# Patient Record
Sex: FEMALE | Race: WHITE | NOT HISPANIC OR LATINO | Employment: OTHER | URBAN - METROPOLITAN AREA
[De-identification: names, ages, dates, MRNs, and addresses within clinical notes are randomized per-mention and may not be internally consistent; named-entity substitution may affect disease eponyms.]

---

## 2022-05-17 ENCOUNTER — HOSPITAL ENCOUNTER (EMERGENCY)
Facility: HOSPITAL | Age: 47
Discharge: HOME | End: 2022-05-17
Attending: EMERGENCY MEDICINE

## 2022-05-17 VITALS
HEIGHT: 66 IN | RESPIRATION RATE: 18 BRPM | HEART RATE: 96 BPM | OXYGEN SATURATION: 97 % | WEIGHT: 180 LBS | TEMPERATURE: 98 F | SYSTOLIC BLOOD PRESSURE: 159 MMHG | DIASTOLIC BLOOD PRESSURE: 91 MMHG | BODY MASS INDEX: 28.93 KG/M2

## 2022-05-17 DIAGNOSIS — L98.9 SKIN ABNORMALITY: Primary | ICD-10-CM

## 2022-05-17 PROCEDURE — 99283 EMERGENCY DEPT VISIT LOW MDM: CPT

## 2022-05-17 PROCEDURE — 25000003 PHARM REV CODE 250: Performed by: NURSE PRACTITIONER

## 2022-05-17 RX ORDER — ELECTROLYTES/DEXTROSE
1 SOLUTION, ORAL ORAL
Status: COMPLETED | OUTPATIENT
Start: 2022-05-17 | End: 2022-05-17

## 2022-05-17 RX ORDER — DOXYCYCLINE HYCLATE 100 MG
100 TABLET ORAL
Status: COMPLETED | OUTPATIENT
Start: 2022-05-17 | End: 2022-05-17

## 2022-05-17 RX ORDER — DOXYCYCLINE 100 MG/1
100 CAPSULE ORAL 2 TIMES DAILY
Qty: 20 CAPSULE | Refills: 0 | Status: SHIPPED | OUTPATIENT
Start: 2022-05-17

## 2022-05-17 RX ADMIN — HYDROCORTISONE 1 TUBE: 0.01 CREAM TOPICAL at 02:05

## 2022-05-17 RX ADMIN — DOXYCYCLINE HYCLATE 100 MG: 100 TABLET, COATED ORAL at 02:05

## 2022-05-17 NOTE — DISCHARGE INSTRUCTIONS
Take antibiotics as ordered. Hydrocortisone cream over the counter, do not use for >7d, cover area with sunscreen or otherwise protect from sun.  Return to the Emergency Department for any worsening, change in condition, or any emergent concerns.

## 2022-05-17 NOTE — ED PROVIDER NOTES
"Encounter Date: 5/17/2022    SCRIBE #1 NOTE: I, Vilma Gill, am scribing for, and in the presence of, Praveen Dawn DNP.       History     Chief Complaint   Patient presents with    Abscess     Pt reporting bump to left side of face. Pt reports it is "stinging". Mild redness noted.      Time seen by the provider: 2:40 PM  47 year old trans-woman presents to the ED to evaluate the small bump on the lower left side of her face that [place] last [place]. Patient states that she "heard a pop sound" and felt it stinging. She reports some mild redness and that the bump is slowly growing in size. Patient is unsure if it is due to an insect bite or sting. Patient has an upcoming plastic surgery and consulted with both her surgeon and PCP that told her to visit the ED for an evaluation. No treatment was attempted PTA. She denies fever, chills, sore throat, ear pain, or other associated symptoms.     The history is provided by the patient. No  was used.     Review of patient's allergies indicates:  No Known Allergies  History reviewed. No pertinent past medical history.  History reviewed. No pertinent surgical history.  History reviewed. No pertinent family history.  Social History     Tobacco Use    Smoking status: Former Smoker    Smokeless tobacco: Never Used   Substance Use Topics    Drug use: Never     Review of Systems   Constitutional: Negative for chills, fatigue and fever.   HENT: Negative for congestion, ear discharge, ear pain, postnasal drip, rhinorrhea, sinus pressure, sneezing, sore throat and voice change.    Eyes: Negative for discharge and itching.   Respiratory: Negative for cough, shortness of breath and wheezing.    Cardiovascular: Negative for chest pain, palpitations and leg swelling.   Gastrointestinal: Negative for abdominal pain, constipation, diarrhea, nausea and vomiting.   Endocrine: Negative for polydipsia, polyphagia and " polyuria.   Genitourinary: Negative for dysuria, frequency, hematuria, urgency, vaginal bleeding, vaginal discharge and vaginal pain.   Musculoskeletal: Negative for arthralgias and myalgias.   Skin: Negative for rash and wound.        (+) raised skin bump on face  (+) mild redness   Neurological: Negative for dizziness, seizures, syncope, weakness and numbness.   Hematological: Negative for adenopathy. Does not bruise/bleed easily.   Psychiatric/Behavioral: Negative for self-injury and suicidal ideas. The patient is not nervous/anxious.        Physical Exam     Initial Vitals [05/17/22 1427]   BP Pulse Resp Temp SpO2   (!) 170/101 96 18 98.4 °F (36.9 °C) 97 %      MAP       --         Physical Exam    Nursing note and vitals reviewed.  Constitutional: She appears well-developed and well-nourished.   HENT:   Head: Normocephalic and atraumatic.   Right Ear: External ear normal.   Left Ear: External ear normal.   Nose: Nose normal.   Eyes: Conjunctivae and EOM are normal. Pupils are equal, round, and reactive to light. Right eye exhibits no discharge. Left eye exhibits no discharge.   Neck:   Normal range of motion.  Cardiovascular: Normal rate, regular rhythm and normal heart sounds.   Pulmonary/Chest: Breath sounds normal.   Abdominal: She exhibits no distension.   Musculoskeletal:         General: Normal range of motion.      Cervical back: Normal range of motion.     Neurological: She is alert and oriented to person, place, and time.   Skin: Skin is dry. Capillary refill takes less than 2 seconds.   Small facial abnormality. Soft, non-tender. Small and discrete, approximately 1 cm. Subcutaneous.          ED Course   Procedures  Labs Reviewed - No data to display       Imaging Results    None          Medications   hydrocortisone-aloe vera 1 % cream 1 Tube (1 Tube Topical (Top) Given 5/17/22 1459)   doxycycline tablet 100 mg (100 mg Oral Given 5/17/22 1459)     Medical Decision Making:   History:   Old Medical  Records: I decided to obtain old medical records.  Initial Assessment:   47 year old trans-woman presents to the ED to evaluate the small bump on the lower left side of her face that appeared while [place] the [place] last week. No tests will be ordered but patient will be prescribed with antibiotics and a topical hydrocortisone cream. If abnormality persists, patient was advised to contact oral maxillofacial surgery for imaging.   Differential Diagnosis:   Differential Diagnosis includes, but is not limited to:  Infection from hair follicle or an early abscess that has not fully formed, insect bite/sting, cellulitis.          Scribe Attestation:   Scribe #1: I performed the above scribed service and the documentation accurately describes the services I performed. I attest to the accuracy of the note.        ED Course as of 05/17/22 2131 Tue May 17, 2022   1430 BP(!): 159/91 [VC]   1430 Temp: 98.4 °F (36.9 °C) [VC]   1430 Temp src: Oral [VC]   1430 Pulse: 96 [VC]   1430 Resp: 18 [VC]   1430 SpO2: 97 % [VC]      ED Course User Index  [VC] Praveen Dawn DNP             Clinical Impression:   Final diagnoses:  [L98.9] Skin abnormality (Primary)          ED Disposition Condition    Discharge Stable        ED Prescriptions     Medication Sig Dispense Start Date End Date Auth. Provider    doxycycline (MONODOX) 100 MG capsule Take 1 capsule (100 mg total) by mouth 2 (two) times daily. 20 capsule 5/17/2022  Praveen Dawn DNP        Follow-up Information     Follow up With Specialties Details Why Contact Info    Rafael Duffy MD Dermatology Schedule an appointment as soon as possible for a visit   [address]  [address]  [facility]  [place] [address]  [phone]         I, Praveen Dawn DNP ACNP-BC FNP-C ENP-C , personally performed the services described in this documentation. All medical record entries made by the scribe were at my direction and in my  presence. I have reviewed the chart and agree that the record reflects my personal performance and is accurate and complete.     Praveen Dawn, HARSHA  05/17/22 [id number]

## 2023-04-05 ENCOUNTER — TELEPHONE (OUTPATIENT)
Dept: UROGYNECOLOGY | Facility: CLINIC | Age: 48
End: 2023-04-05

## 2023-04-05 NOTE — TELEPHONE ENCOUNTER
Called patient regarding earlier appointment time today at 2pm. Patient will check and get back to me.

## 2024-09-20 ENCOUNTER — HOSPITAL ENCOUNTER (EMERGENCY)
Facility: HOSPITAL | Age: 49
Discharge: HOME | End: 2024-09-20
Attending: EMERGENCY MEDICINE

## 2024-09-20 VITALS
SYSTOLIC BLOOD PRESSURE: 138 MMHG | HEIGHT: 64 IN | WEIGHT: 180 LBS | RESPIRATION RATE: 18 BRPM | DIASTOLIC BLOOD PRESSURE: 79 MMHG | HEART RATE: 79 BPM | OXYGEN SATURATION: 100 % | BODY MASS INDEX: 30.73 KG/M2 | TEMPERATURE: 98 F

## 2024-09-20 DIAGNOSIS — S51.812A LACERATION OF LEFT FOREARM: ICD-10-CM

## 2024-09-20 LAB
ALBUMIN SERPL BCP-MCNC: 4.1 G/DL (ref 3.5–5.2)
ALP SERPL-CCNC: 57 U/L (ref 55–135)
ALT SERPL W/O P-5'-P-CCNC: 20 U/L (ref 10–44)
ANION GAP SERPL CALC-SCNC: 10 MMOL/L (ref 8–16)
AST SERPL-CCNC: 14 U/L (ref 10–40)
BASOPHILS # BLD AUTO: 0.05 K/UL (ref 0–0.2)
BASOPHILS NFR BLD: 0.4 % (ref 0–1.9)
BILIRUB SERPL-MCNC: 0.3 MG/DL (ref 0.1–1)
BUN SERPL-MCNC: 15 MG/DL (ref 6–20)
CALCIUM SERPL-MCNC: 9.5 MG/DL (ref 8.7–10.5)
CHLORIDE SERPL-SCNC: 107 MMOL/L (ref 95–110)
CO2 SERPL-SCNC: 19 MMOL/L (ref 23–29)
CREAT SERPL-MCNC: 1.1 MG/DL (ref 0.5–1.4)
DIFFERENTIAL METHOD BLD: ABNORMAL
EOSINOPHIL # BLD AUTO: 0 K/UL (ref 0–0.5)
EOSINOPHIL NFR BLD: 0.2 % (ref 0–8)
ERYTHROCYTE [DISTWIDTH] IN BLOOD BY AUTOMATED COUNT: 12 % (ref 11.5–14.5)
EST. GFR  (NO RACE VARIABLE): >60 ML/MIN/1.73 M^2
GLUCOSE SERPL-MCNC: 100 MG/DL (ref 70–110)
HCT VFR BLD AUTO: 39.1 % (ref 37–48.5)
HGB BLD-MCNC: 13.5 G/DL (ref 12–16)
IMM GRANULOCYTES # BLD AUTO: 0.04 K/UL (ref 0–0.04)
IMM GRANULOCYTES NFR BLD AUTO: 0.3 % (ref 0–0.5)
LYMPHOCYTES # BLD AUTO: 1.8 K/UL (ref 1–4.8)
LYMPHOCYTES NFR BLD: 13.2 % (ref 18–48)
MCH RBC QN AUTO: 30.7 PG (ref 27–31)
MCHC RBC AUTO-ENTMCNC: 34.5 G/DL (ref 32–36)
MCV RBC AUTO: 89 FL (ref 82–98)
MONOCYTES # BLD AUTO: 0.8 K/UL (ref 0.3–1)
MONOCYTES NFR BLD: 5.6 % (ref 4–15)
NEUTROPHILS # BLD AUTO: 10.7 K/UL (ref 1.8–7.7)
NEUTROPHILS NFR BLD: 80.3 % (ref 38–73)
NRBC BLD-RTO: 0 /100 WBC
PLATELET # BLD AUTO: ABNORMAL K/UL (ref 150–450)
PMV BLD AUTO: ABNORMAL FL (ref 9.2–12.9)
POTASSIUM SERPL-SCNC: 4.2 MMOL/L (ref 3.5–5.1)
PROT SERPL-MCNC: 7.7 G/DL (ref 6–8.4)
RBC # BLD AUTO: 4.4 M/UL (ref 4–5.4)
SODIUM SERPL-SCNC: 136 MMOL/L (ref 136–145)
WBC # BLD AUTO: 13.31 K/UL (ref 3.9–12.7)

## 2024-09-20 PROCEDURE — 80053 COMPREHEN METABOLIC PANEL: CPT

## 2024-09-20 PROCEDURE — 85025 COMPLETE CBC W/AUTO DIFF WBC: CPT

## 2024-09-20 PROCEDURE — 12005 RPR S/N/A/GEN/TRK12.6-20.0CM: CPT

## 2024-09-20 PROCEDURE — 25000003 PHARM REV CODE 250

## 2024-09-20 PROCEDURE — 63600175 PHARM REV CODE 636 W HCPCS

## 2024-09-20 PROCEDURE — 96365 THER/PROPH/DIAG IV INF INIT: CPT

## 2024-09-20 PROCEDURE — 96375 TX/PRO/DX INJ NEW DRUG ADDON: CPT

## 2024-09-20 PROCEDURE — 63600175 PHARM REV CODE 636 W HCPCS: Performed by: EMERGENCY MEDICINE

## 2024-09-20 PROCEDURE — 99284 EMERGENCY DEPT VISIT MOD MDM: CPT | Mod: 25

## 2024-09-20 RX ORDER — ONDANSETRON HYDROCHLORIDE 2 MG/ML
4 INJECTION, SOLUTION INTRAVENOUS
Status: COMPLETED | OUTPATIENT
Start: 2024-09-20 | End: 2024-09-20

## 2024-09-20 RX ORDER — KETOROLAC TROMETHAMINE 30 MG/ML
15 INJECTION, SOLUTION INTRAMUSCULAR; INTRAVENOUS
Status: COMPLETED | OUTPATIENT
Start: 2024-09-20 | End: 2024-09-20

## 2024-09-20 RX ORDER — MUPIROCIN 20 MG/G
OINTMENT TOPICAL 3 TIMES DAILY
Qty: 22 G | Refills: 0 | Status: SHIPPED | OUTPATIENT
Start: 2024-09-20

## 2024-09-20 RX ORDER — LIDOCAINE HYDROCHLORIDE 10 MG/ML
10 INJECTION, SOLUTION INFILTRATION; PERINEURAL
Status: COMPLETED | OUTPATIENT
Start: 2024-09-20 | End: 2024-09-20

## 2024-09-20 RX ORDER — HYDROCODONE BITARTRATE AND ACETAMINOPHEN 5; 325 MG/1; MG/1
1 TABLET ORAL EVERY 6 HOURS PRN
Qty: 12 TABLET | Refills: 0 | Status: SHIPPED | OUTPATIENT
Start: 2024-09-20 | End: 2024-09-23

## 2024-09-20 RX ORDER — IBUPROFEN 800 MG/1
800 TABLET ORAL EVERY 6 HOURS PRN
Qty: 20 TABLET | Refills: 0 | Status: SHIPPED | OUTPATIENT
Start: 2024-09-20

## 2024-09-20 RX ORDER — MUPIROCIN 20 MG/G
1 OINTMENT TOPICAL
Status: COMPLETED | OUTPATIENT
Start: 2024-09-20 | End: 2024-09-20

## 2024-09-20 RX ADMIN — KETOROLAC TROMETHAMINE 15 MG: 30 INJECTION, SOLUTION INTRAMUSCULAR at 04:09

## 2024-09-20 RX ADMIN — ONDANSETRON 4 MG: 2 INJECTION INTRAMUSCULAR; INTRAVENOUS at 01:09

## 2024-09-20 RX ADMIN — LIDOCAINE HYDROCHLORIDE 10 ML: 10 INJECTION, SOLUTION INFILTRATION; PERINEURAL at 01:09

## 2024-09-20 RX ADMIN — MUPIROCIN 1 TUBE: 20 OINTMENT TOPICAL at 04:09

## 2024-09-20 RX ADMIN — CEFAZOLIN 2 G: 2 INJECTION, POWDER, FOR SOLUTION INTRAMUSCULAR; INTRAVENOUS at 01:09

## 2024-09-20 NOTE — DISCHARGE INSTRUCTIONS

## 2024-09-21 NOTE — ED PROVIDER NOTES
Encounter Date: 9/20/2024       History     Chief Complaint   Patient presents with    Laceration     Pt to ER with laceration to left forearm s/p trip and fall into hurricane shutters. EMS reports laceration proximately 4in wide and down to bone. Pt unsure of tetanus. Pt given 100mc of  fentanyl in rout.      Swetha Underwood is a 49-year-old female with no pertinent past medical history who presents to the emergency department with a chief complaint of laceration.  Her puppy ran out of her front door and while she was chasing it, she fell down and during the fall sustained a laceration to the left forearm and left upper arm on a metal hurricane Shutter.  Arrived via EMS who state laceration is down to the bone.  She was given 100 mcg of fentanyl en route.  Denies head injury, loss of consciousness, vomiting, or seizure activity.    The history is provided by the patient. No  was used.     Review of patient's allergies indicates:  No Known Allergies  History reviewed. No pertinent past medical history.  History reviewed. No pertinent surgical history.  No family history on file.  Social History     Tobacco Use    Smoking status: Former    Smokeless tobacco: Never   Substance Use Topics    Drug use: Never     Review of Systems   Constitutional:  Negative for chills and fever.   HENT:  Negative for sore throat.    Respiratory:  Negative for shortness of breath.    Cardiovascular:  Negative for chest pain.   Gastrointestinal:  Negative for nausea and vomiting.   Genitourinary:  Negative for dysuria.   Musculoskeletal:  Negative for back pain.   Skin:  Positive for wound. Negative for rash.   Neurological:  Negative for syncope, weakness and headaches.   Hematological:  Does not bruise/bleed easily.       Physical Exam     Initial Vitals [09/20/24 1237]   BP Pulse Resp Temp SpO2   133/80 84 18 97.9 °F (36.6 °C) 96 %      MAP       --         Physical Exam    Nursing note and vitals  reviewed.  Constitutional: Vital signs are normal. She appears well-developed and well-nourished. She is cooperative. She does not appear ill. No distress.   HENT:   Head: Normocephalic and atraumatic.   Right Ear: Hearing and external ear normal.   Left Ear: Hearing and external ear normal.   Nose: Nose normal.   Eyes: Conjunctivae and EOM are normal.   Neck: Phonation normal.   Normal range of motion.  Cardiovascular:  Normal rate and regular rhythm.           No murmur heard.  Pulmonary/Chest: Effort normal. No respiratory distress.   Abdominal: Abdomen is soft. She exhibits no distension. There is no abdominal tenderness.   Musculoskeletal:        Arms:       Cervical back: Normal range of motion.      Comments: Two lacerations to the left arm.  Laceration to the left upper arm is to the dermis, no subcutaneous tissue showing.  Approximately 5 cm in length.  Y shaped laceration to the left forearm just distal to the elbow.  Gaping.  Wound is to the level of muscle, however no laceration of the muscle itself.  Overlying fascia is visible as well as subcutaneous fat.  No visible bone.     Neurological: She is alert and oriented to person, place, and time. GCS eye subscore is 4. GCS verbal subscore is 5. GCS motor subscore is 6.   Skin: Skin is warm. Capillary refill takes less than 2 seconds.         ED Course   Lac Repair    Date/Time: 9/21/2024 2:29 PM    Performed by: Girma Haider PA-C  Authorized by: Kike Sparks MD    Consent:     Consent obtained:  Verbal    Consent given by:  Patient    Risks, benefits, and alternatives were discussed: yes      Risks discussed:  Infection, pain, retained foreign body and need for additional repair    Alternatives discussed:  No treatment  Universal protocol:     Procedure explained and questions answered to patient or proxy's satisfaction: yes      Patient identity confirmed:  Verbally with patient and provided demographic data  Anesthesia:     Anesthesia method:   Local infiltration    Local anesthetic:  Lidocaine 1% w/o epi  Laceration details:     Location:  Shoulder/arm    Shoulder/arm location:  L lower arm    Length (cm):  8    Depth (mm):  7  Pre-procedure details:     Preparation:  Patient was prepped and draped in usual sterile fashion (Betadine scrub, irrigation)  Exploration:     Limited defect created (wound extended): no      Hemostasis achieved with:  Direct pressure    Imaging obtained: x-ray      Imaging outcome: foreign body not noted      Wound exploration: wound explored through full range of motion and entire depth of wound visualized      Wound extent: no foreign bodies/material noted, no muscle damage noted, no tendon damage noted, no underlying fracture noted and no vascular damage noted      Contaminated: no    Treatment:     Area cleansed with:  Povidone-iodine and saline    Amount of cleaning:  Extensive    Irrigation solution:  Sterile saline  Skin repair:     Repair method:  Sutures    Suture size:  4-0    Suture material:  Prolene    Suture technique:  Simple interrupted    Number of sutures:  9  Approximation:     Approximation:  Close  Repair type:     Repair type:  Simple  Post-procedure details:     Dressing:  Antibiotic ointment and sterile dressing    Procedure completion:  Tolerated with difficulty (Patient was required multiple rounds of local anesthetic, still had some pain during repair.)  Comments:      Assisting provider KRISTI Diez-Kim  Lac Repair    Date/Time: 9/21/2024 2:33 PM    Performed by: Girma Haider PA-C  Authorized by: Kike Sparks MD    Consent:     Consent obtained:  Verbal    Consent given by:  Patient    Risks, benefits, and alternatives were discussed: yes      Risks discussed:  Infection, pain, retained foreign body and need for additional repair    Alternatives discussed:  No treatment  Universal protocol:     Procedure explained and questions answered to patient or proxy's satisfaction: yes       Patient identity confirmed:  Verbally with patient and provided demographic data  Anesthesia:     Anesthesia method:  None  Laceration details:     Location:  Shoulder/arm    Shoulder/arm location:  L upper arm    Length (cm):  5    Depth (mm):  1  Pre-procedure details:     Preparation:  Patient was prepped and draped in usual sterile fashion  Exploration:     Limited defect created (wound extended): no      Hemostasis achieved with:  Direct pressure    Imaging outcome: foreign body not noted      Wound exploration: wound explored through full range of motion and entire depth of wound visualized      Contaminated: no    Treatment:     Area cleansed with:  Saline and soap and water    Amount of cleaning:  Standard    Irrigation solution:  Sterile saline  Skin repair:     Repair method:  Steri-Strips    Number of Steri-Strips:  17  Approximation:     Approximation:  Close  Repair type:     Repair type:  Simple  Post-procedure details:     Dressing:  Open (no dressing)    Procedure completion:  Tolerated well, no immediate complications    Labs Reviewed   CBC W/ AUTO DIFFERENTIAL - Abnormal       Result Value    WBC 13.31 (*)     RBC 4.40      Hemoglobin 13.5      Hematocrit 39.1      MCV 89      MCH 30.7      MCHC 34.5      RDW 12.0      Platelets SEE COMMENT      MPV SEE COMMENT      Immature Granulocytes 0.3      Gran # (ANC) 10.7 (*)     Immature Grans (Abs) 0.04      Lymph # 1.8      Mono # 0.8      Eos # 0.0      Baso # 0.05      nRBC 0      Gran % 80.3 (*)     Lymph % 13.2 (*)     Mono % 5.6      Eosinophil % 0.2      Basophil % 0.4      Differential Method Automated     COMPREHENSIVE METABOLIC PANEL - Abnormal    Sodium 136      Potassium 4.2      Chloride 107      CO2 19 (*)     Glucose 100      BUN 15      Creatinine 1.1      Calcium 9.5      Total Protein 7.7      Albumin 4.1      Total Bilirubin 0.3      Alkaline Phosphatase 57      AST 14      ALT 20      eGFR >60      Anion Gap 10            Imaging  Results              X-Ray Forearm Left (Final result)  Result time 09/20/24 13:30:33      Final result by Ant Klein MD (09/20/24 13:30:33)                   Impression:      No evidence of fracture or foreign body.      Electronically signed by: Ant Klein MD  Date:    09/20/2024  Time:    13:30               Narrative:    EXAMINATION:  XR FOREARM LEFT    CLINICAL HISTORY:  Laceration without foreign body of left forearm, initial encounter    TECHNIQUE:  AP and lateral views of the left forearm were performed.    COMPARISON:  None    FINDINGS:  No evidence of acute fracture or dislocation.  Joint spaces are maintained.  No radiopaque foreign body.                                       Medications   ceFAZolin 2 g in D5W 50 mL IVPB (MB+) (0 g Intravenous Stopped 9/20/24 1430)   LIDOcaine HCL 10 mg/ml (1%) injection 10 mL (10 mLs Infiltration Given 9/20/24 1354)   ondansetron injection 4 mg (4 mg Intravenous Given 9/20/24 1340)   mupirocin 2 % ointment 1 Tube (1 Tube Topical (Top) Given 9/20/24 1621)   ketorolac injection 15 mg (15 mg Intravenous Given 9/20/24 1621)     Medical Decision Making  41-year-old female presenting to the emergency department with a chief complaint of two lacerations sustained on a metal shutter.  EMS reported laceration was to the bone, however on exam it was to the level of superficial fascia, no muscle or bone involvement.  Wounds cleaned thoroughly and repaired as described in procedure note.  Some issues with local anesthetic, however overall patient tolerated the procedure well.  There were no acute complications.    Differential diagnosis includes but is not limited to laceration with or without muscle, tendon, ligament, bone, or neurovascular damage, foreign body, or surrounding soft tissue infection such as cellulitis or erysipelas.     X-rays were negative for any acute fractures, dislocations, or foreign bodies.  Stable for discharge home to outpatient follow up.   Return to the emergency department in 10 days for suture removal or sooner if any signs of infection or other concerning symptoms develop.    Return precautions were discussed, all patient questions were answered, and the patient was agreeable to the plan of care.  She was discharged home in stable condition and will follow up with her primary care provider or return to the emergency department if her symptoms worsen or do not improve.     Amount and/or Complexity of Data Reviewed  Labs: ordered.  Radiology: ordered.    Risk  Prescription drug management.               ED Course as of 09/21/24 1436   Fri Sep 20, 2024   1617 Sbar taken from GENEVA Haider, my care begins now. [VC]      ED Course User Index  [VC] Praveen Dawn DNP                           Clinical Impression:  Final diagnoses:  [S56.589V] Laceration of left forearm          ED Disposition Condition    Discharge Stable          ED Prescriptions       Medication Sig Dispense Start Date End Date Auth. Provider    mupirocin (BACTROBAN) 2 % ointment Apply topically 3 (three) times daily. 22 g 9/20/2024 -- Girma Haider PA-C    ibuprofen (ADVIL,MOTRIN) 800 MG tablet Take 1 tablet (800 mg total) by mouth every 6 (six) hours as needed for Pain. 20 tablet 9/20/2024 -- Girma Haider PA-C    HYDROcodone-acetaminophen (NORCO) 5-325 mg per tablet Take 1 tablet by mouth every 6 (six) hours as needed (Pain not relieved by other methods). 12 tablet 9/20/2024 9/23/2024 Girma Haider PA-C          Follow-up Information       Follow up With Specialties Details Why Contact Info    [address] Girma villar [facility] -  Schedule an appointment as soon as possible for a visit  As needed, If symptoms worsen [address] [place] [address]  [phone]      [facility] Go in 10 days For suture removal [address]  [facility] [place] [id number] [phone]             Vitaly  Girma BAIN PA-C  09/21/24 [id number]